# Patient Record
Sex: FEMALE | Race: WHITE | Employment: OTHER | URBAN - METROPOLITAN AREA
[De-identification: names, ages, dates, MRNs, and addresses within clinical notes are randomized per-mention and may not be internally consistent; named-entity substitution may affect disease eponyms.]

---

## 2018-05-05 PROBLEM — I71.21 ASCENDING AORTIC ANEURYSM: Status: ACTIVE | Noted: 2018-05-05

## 2018-05-05 PROBLEM — R91.1 PULMONARY NODULE: Status: ACTIVE | Noted: 2018-05-05

## 2020-02-18 ENCOUNTER — INITIAL CONSULT (OUTPATIENT)
Dept: PULMONOLOGY | Age: 70
End: 2020-02-18
Payer: COMMERCIAL

## 2020-02-18 VITALS
HEIGHT: 65 IN | SYSTOLIC BLOOD PRESSURE: 128 MMHG | DIASTOLIC BLOOD PRESSURE: 80 MMHG | BODY MASS INDEX: 48.82 KG/M2 | OXYGEN SATURATION: 97 % | HEART RATE: 81 BPM | WEIGHT: 293 LBS

## 2020-02-18 PROCEDURE — 99243 OFF/OP CNSLTJ NEW/EST LOW 30: CPT | Performed by: NURSE PRACTITIONER

## 2020-02-18 RX ORDER — CHLORAL HYDRATE 500 MG
1000 CAPSULE ORAL 2 TIMES DAILY
COMMUNITY

## 2020-02-18 RX ORDER — ATORVASTATIN CALCIUM 40 MG/1
40 TABLET, FILM COATED ORAL DAILY
COMMUNITY
Start: 2018-04-02

## 2020-02-18 RX ORDER — MAGNESIUM OXIDE 400 MG/1
400 TABLET ORAL DAILY
COMMUNITY

## 2020-02-18 ASSESSMENT — SLEEP AND FATIGUE QUESTIONNAIRES
HOW LIKELY ARE YOU TO NOD OFF OR FALL ASLEEP WHILE SITTING AND TALKING TO SOMEONE: 0
HOW LIKELY ARE YOU TO NOD OFF OR FALL ASLEEP IN A CAR, WHILE STOPPED FOR A FEW MINUTES IN TRAFFIC: 0
NECK CIRCUMFERENCE (INCHES): 16
HOW LIKELY ARE YOU TO NOD OFF OR FALL ASLEEP WHEN YOU ARE A PASSENGER IN A CAR FOR AN HOUR WITHOUT A BREAK: 2
HOW LIKELY ARE YOU TO NOD OFF OR FALL ASLEEP WHILE SITTING INACTIVE IN A PUBLIC PLACE: 0
ESS TOTAL SCORE: 5
HOW LIKELY ARE YOU TO NOD OFF OR FALL ASLEEP WHILE WATCHING TV: 0
HOW LIKELY ARE YOU TO NOD OFF OR FALL ASLEEP WHILE LYING DOWN TO REST IN THE AFTERNOON WHEN CIRCUMSTANCES PERMIT: 3
HOW LIKELY ARE YOU TO NOD OFF OR FALL ASLEEP WHILE SITTING AND READING: 0
HOW LIKELY ARE YOU TO NOD OFF OR FALL ASLEEP WHILE SITTING QUIETLY AFTER LUNCH WITHOUT ALCOHOL: 0

## 2020-02-18 NOTE — PROGRESS NOTES
Subjective:   CHIEF COMPLAINT / HPI:       Perico Newell is a 68 y.o. female with history of bronchitis, coronary artery disease, ovarian cancer, kidney stones, type 2 diabetes, atrial valve replacement, AAA, presents today for concerns of fatigue, hypersomnia, possible NII. Krishna Raygoza states that she has noticed for years that she has been fatigued and does not feel like she sleeps well. She states when she was in the hospital for her surgeries she was told that she would drop her oxygen level into the mid 80s while she was sleeping. There was no further work-up for possible nocturnal hypoxia while she was at the hospital..  With talking to her family primary care provider Dr. Hayley Prasad it was decided that she should have a sleep study and nocturnal oxygen study. Krishna Raygoza states that she usually goes to bed around 10 PM, sleeps generally 5-6 hours a night. States awakens during the night 1-2 time due to nocturia and restless and falls back to sleep within 15 minutes after getting up. States in the morning she is fatigued and does not feel like she has slept well. She is sleepy during the day but does not nap. Family reports mild snoring. Denies any symptoms of CHF. States dose not have restless leg syndrome, but will get up during night to walk around sometime due to restlessness. Sleep is not interrupted due to chronic pain. There has been witness apnea.     Influenza refuses  Pneumovax refuses  Smoker former smoker, quit 6/2004  PCP Hayley Prasad MD    Past Medical History:  Past Medical History:   Diagnosis Date    Bronchitis     CAD (coronary artery disease)     Cancer ([facility] [address]     Heart valve disorder     Kidney stones     Type 2 diabetes mellitus without complication (HCC)        Current Medications:      Current Outpatient Medications:     atorvastatin (LIPITOR) 40 MG tablet, Take 40 mg by mouth daily, Disp: , Rfl:     magnesium oxide (MAG-OX) 400 MG tablet, Take 400 mg by mouth daily, Disp: , Rfl:    current or ex partner: None     Emotionally abused: None     Physically abused: None     Forced sexual activity: None   Other Topics Concern    None   Social History Narrative    None       Family History:    Family History   Problem Relation Age of Onset    Kidney Disease Mother     Cancer Father          REVIEW OF SYSTEMS:    CONSTITUTIONAL:  negative for fevers, chills, diaphoresis, activity change, appetite change, night sweats and unexpected weight change. Positive fatigue, positive hypersomnia  HEENT:  negative for hearing loss,  sinus pressure, nasal congestion, epistaxis, positive snoring  RESPIRATORY: Negative for shortness of breath, negative for wheezing, negative for cough  CARDIOVASCULAR:  Negative for chest pain, palpitations, exertional chest pressure/discomfort, edema, syncope  GASTROINTESTINAL: negative for nausea, vomiting, diarrhea, constipation, blood in stool and abdominal pain  GENITOURINARY:  negative for frequency, dysuria and hematuria  HEMATOLOGIC/LYMPHATIC:  negative for easy bruising, bleeding and lymphadenopathy  ALLERGIC/IMMUNOLOGIC:  negative for recurrent infections, angioedema, anaphylaxis and drug reaction  MUSCULOSKELETAL:  negative for  pain, joint swelling, decreased range of motion and muscle weakness  NEURO: negative for headache, AMS, decrease sensations  SKIN: Negative for rashes or lesions      Objective:   VITALS:   Vitals:    02/18/20 1433   BP: 128/80   Pulse: 81   SpO2: 97%   Weight: 300 lb (136.1 kg)   Height: 5' 4.5\" (1.638 m)     Neck circumference: 16  Inches  Frannie - Total score: 5  MALLAMPATI:4  Body mass index is 50.7 kg/m².     PHYSICAL EXAM:    CONSTITUTIONAL:  awake, alert, cooperative, no apparent distress, and appears stated age  HEENT:  Supple and nontender,  trachea midline, no adenopathy, thyroid normal, no JVD, no wheezing or stridor over neck  CHEST: Chest expansion equal and symmetrical, no intercostal retraction, no increased work of breathing  LUNGS: Bilateral breath sounds clear and equal to auscultation with good air movement, no rales, no rhonchi, no wheezing, no cough, no chest pain with respiratory effort   CARDIOVASCULAR: Normal S1 and S2 , no murmurs or gallops ,no pericardial rubs  ABDOMEN:  normal bowel sounds, non-distended and no masses palpated, and no tenderness to palpation. LYMPHADENOPATHY:  no axillary or supraclavicular adenopathy. No cervical adenopathy  EXTREMITIES: No edema, no inflammation, no tenderness, no clubbing of digits  NEURO: Oriented X 3, No focal deficits  SKIN: warm and dry    LAB:CBC with Differential:    Lab Results   Component Value Date    WBC 8.1 06/06/2014    RBC 4.48 06/06/2014    HGB 14.4 06/06/2014    HCT 41.3 06/06/2014     06/06/2014    MCV 92.3 06/06/2014    MCH 32.3 06/06/2014    MCHC 35.0 06/06/2014    RDW 14.0 06/06/2014    SEGSPCT 69.9 06/06/2014    LYMPHOPCT 20.4 06/06/2014    MONOPCT 7.5 06/06/2014    BASOPCT 0.4 06/06/2014    MONOSABS 0.6 06/06/2014    LYMPHSABS 1.6 06/06/2014    EOSABS 0.1 06/06/2014    BASOSABS 0.0 06/06/2014    DIFFTYPE AUTOMATED DIFFERENTIAL 06/06/2014     BMP:    Lab Results   Component Value Date     06/06/2014    K 4.3 06/06/2014     06/06/2014    CO2 29 06/06/2014    BUN 18 06/06/2014    CREATININE 0.7 06/06/2014    CALCIUM 9.8 06/06/2014    GFRAA >60 06/06/2014    LABGLOM >60 06/06/2014     Hepatic Function Panel:    Lab Results   Component Value Date    ALKPHOS 84 06/06/2014    ALT 39 06/06/2014    AST 29 06/06/2014    PROT 7.9 06/06/2014    BILITOT 0.8 06/06/2014     ABG:  No results found for: RGK0SGA, BEART, F5PGJPXY, PHART, THGBART, NLS8TNP, PO2ART, RAM3FFQ    RADIOLOGY:        Assessment/Plan     1. Nocturnal hypoxia  We will proceed with an overnight apnea link to check for nocturnal hypoxia. If testing comes back with an AHI greater than 5 she is aware that she will need to proceed with an in lab sleep study.   In the meantime I have

## 2020-03-12 ENCOUNTER — TELEPHONE (OUTPATIENT)
Dept: PULMONOLOGY | Age: 70
End: 2020-03-12

## 2020-03-12 NOTE — TELEPHONE ENCOUNTER
Spoke to Leodan concerning her recent apnea link overnight oximetry test.  Testing demonstrated AHI of 13.8 and she will need sleep lab study. I will place the order for study today and informed her that the sleep lab will be contacting her to schedule.

## 2020-03-13 PROBLEM — I48.92 ATRIAL FLUTTER (HCC): Status: ACTIVE | Noted: 2020-01-13

## 2020-03-13 PROBLEM — I38 VALVULAR HEART DISEASE: Status: ACTIVE | Noted: 2020-03-13

## 2020-03-13 PROBLEM — E78.2 MIXED HYPERLIPIDEMIA: Status: ACTIVE | Noted: 2020-01-14

## 2020-03-13 PROBLEM — Z95.2 HISTORY OF HEART VALVE REPLACEMENT WITH MECHANICAL VALVE: Status: ACTIVE | Noted: 2020-03-13

## 2020-06-18 ENCOUNTER — HOSPITAL ENCOUNTER (OUTPATIENT)
Dept: SLEEP CENTER | Age: 70
Discharge: HOME | End: 2020-06-18
Payer: COMMERCIAL

## 2020-06-18 VITALS — HEIGHT: 65 IN | BODY MASS INDEX: 48.82 KG/M2 | WEIGHT: 293 LBS

## 2020-06-18 PROCEDURE — 95810 POLYSOM 6/> YRS 4/> PARAM: CPT

## 2020-06-18 ASSESSMENT — SLEEP AND FATIGUE QUESTIONNAIRES
HOW LIKELY ARE YOU TO NOD OFF OR FALL ASLEEP WHILE SITTING QUIETLY AFTER LUNCH WITHOUT ALCOHOL: 0
HOW LIKELY ARE YOU TO NOD OFF OR FALL ASLEEP WHILE SITTING AND READING: 0
HOW LIKELY ARE YOU TO NOD OFF OR FALL ASLEEP WHEN YOU ARE A PASSENGER IN A CAR FOR AN HOUR WITHOUT A BREAK: 0
HOW LIKELY ARE YOU TO NOD OFF OR FALL ASLEEP WHILE SITTING AND TALKING TO SOMEONE: 0
HOW LIKELY ARE YOU TO NOD OFF OR FALL ASLEEP WHILE SITTING INACTIVE IN A PUBLIC PLACE: 0
HOW LIKELY ARE YOU TO NOD OFF OR FALL ASLEEP WHILE LYING DOWN TO REST IN THE AFTERNOON WHEN CIRCUMSTANCES PERMIT: 3
HOW LIKELY ARE YOU TO NOD OFF OR FALL ASLEEP WHILE WATCHING TV: 2
HOW LIKELY ARE YOU TO NOD OFF OR FALL ASLEEP IN A CAR, WHILE STOPPED FOR A FEW MINUTES IN TRAFFIC: 0
ESS TOTAL SCORE: 5

## 2020-06-19 NOTE — PROGRESS NOTES
6/19/2020  sleep study  for [facility]  1950 is complete. Results are pending physician review.     Electronically signed by Leila Ayers on 6/19/2020 at 7:08 AM

## 2020-06-24 LAB — STATUS: NORMAL

## 2020-06-24 PROCEDURE — 95810 POLYSOM 6/> YRS 4/> PARAM: CPT | Performed by: INTERNAL MEDICINE

## 2020-06-25 ENCOUNTER — TELEPHONE (OUTPATIENT)
Dept: PULMONOLOGY | Age: 70
End: 2020-06-25

## 2020-08-17 NOTE — PROGRESS NOTES
*                          [address] [facility]   Follow Up Visit    Patient ID: Mk Rehman is a 68 y.o. female, presents to the pulmonary clinic for follow up for NII. Initial sleep study was completed on 6/18/2020 and demonstrated AHI 21.3 events/hour. AutoPAP was ordered with pressures 4 to 20 cm H2O with via pillow mask. Mk Rehman has been wearing CPAP for average of 10 hours, 17 minutes, and is compliant with use. Lansing Ezio states improved rest, less daytime fatigue and sleepiness. DME: [address] [facility] states they are practicing social distancing, wearing a mask when out in public, washing hands frequently or using hand . Denies any fever, chills, malaise, change in sensation of taste or smell, headache or lightheadedness. Denies any known contacts with persons with respiratory infection, positive for coronavirus or under investigation for possible coronavirus exposure. Smoking history:  Former smoker  PCP: Prince Alcocer MD    History:      Social History     Socioeconomic History    Marital status:      Spouse name: Not on file    Number of children: Not on file    Years of education: Not on file    Highest education level: Not on file   Occupational History    Not on file   Social Needs    Financial resource strain: Not on file    Food insecurity     Worry: Not on file     Inability: Not on file    Transportation needs     Medical: Not on file     Non-medical: Not on file   Tobacco Use    Smoking status: Former Smoker    Smokeless tobacco: Never Used   Substance and Sexual Activity    Alcohol use: No    Drug use: No    Sexual activity: Not on file   Lifestyle    Physical activity     Days per week: Not on file     Minutes per session: Not on file    Stress: Not on file   Relationships    Social connections     Talks on phone: Not on file     Gets together: Not on file     Attends Mandaeism service: Not on file     Active member of club or organization: Not on file     Attends meetings of clubs or organizations: Not on file     Relationship status: Not on file    Intimate partner violence     Fear of current or ex partner: Not on file     Emotionally abused: Not on file     Physically abused: Not on file     Forced sexual activity: Not on file   Other Topics Concern    Not on file   Social History Narrative    Not on file       Prior to Admission medications    Medication Sig Start Date End Date Taking? Authorizing Provider   albuterol sulfate  (90 Base) MCG/ACT inhaler Inhale 2 puffs into the lungs every 6 hours as needed 6/24/20  Yes Historical Provider, MD   atorvastatin (LIPITOR) 40 MG tablet Take 40 mg by mouth daily 4/2/18  Yes Historical Provider, MD   magnesium oxide (MAG-OX) 400 MG tablet Take 400 mg by mouth daily   Yes Historical Provider, MD   [place]-3 1000 MG CAPS Take 1,000 mg by mouth 2 times daily   Yes Historical Provider, MD   metFORMIN (GLUCOPHAGE) 1000 MG tablet Take 1 tablet by mouth 2 times daily 1/13/20  Yes Historical Provider, MD   calcium carbonate 600 MG TABS tablet Take 1 tablet by mouth daily   Yes Historical Provider, MD   Glucosamine-Chondroit-Vit C-Mn (GLUCOSAMINE-CHONDROITIN MAX ST PO) Take 2 tablets by mouth daily    Yes Historical Provider, MD   ibuprofen (ADVIL;MOTRIN) 200 MG tablet Take 200 mg by mouth every 6 hours as needed for Pain   Yes Historical Provider, MD   Multiple Vitamin (MULTI-VITAMIN DAILY PO) Take 1 tablet by mouth daily   Yes Historical Provider, MD   warfarin (COUMADIN) 5 MG tablet Take 5 mg by mouth Daily. Yes Historical Provider, MD   metoprolol (LOPRESSOR) 50 MG tablet Take 50 mg by mouth. 1/2 tab twice a day. Yes Historical Provider, MD   lisinopril-hydrochlorothiazide (PRINZIDE;ZESTORETIC) 10-12.5 MG per tablet Take 1 tablet by mouth.    Yes Historical Provider, MD [place]amm[place] Oil OIL Take  by mouth 2 times daily. Yes Historical Provider, MD       Allergies as of 08/18/2020    (No Known Allergies)       Patient Active Problem List   Diagnosis    [facility] ([facility] [address]    Pulmonary nodule    Atrial flutter (HCC)    History of heart valve replacement with mechanical valve    Mixed hyperlipidemia    Presence of prosthetic heart valve    Valvular heart disease    NII (obstructive sleep apnea)       Past Medical History:   Diagnosis Date    Bronchitis     CAD (coronary artery disease)     Cancer ([facility] [address]     Heart valve disorder     Kidney stones     Type 2 diabetes mellitus without complication (HCC)        Past Surgical History:   Procedure Laterality Date    HYSTERECTOMY, TOTAL ABDOMINAL      KNEE ARTHROSCOPY      TIBIA FRACTURE SURGERY         Family History   Problem Relation Age of Onset    Kidney Disease Mother     Cancer Father          REVIEW OF SYSTEMS:    CONSTITUTIONAL:  negative for fevers, chills, diaphoresis, activity change, appetite change, night sweats and unexpected weight change.    HEENT:  negative for hearing loss,  sinus pressure, nasal congestion, epistaxis and snoring  RESPIRATORY:  negative for SOB, cough, wheeze  CARDIOVASCULAR:  negative for chest pain, palpitations, exertional chest pressure/discomfort, edema, syncope  GASTROINTESTINAL: negative for nausea, vomiting, diarrhea, constipation, blood in stool and abdominal pain  GENITOURINARY:  negative for frequency, dysuria and hematuria  HEMATOLOGIC/LYMPHATIC:  negative for easy bruising, bleeding and lymphadenopathy  ALLERGIC/IMMUNOLOGIC:  negative for recurrent infections, angioedema, anaphylaxis and drug reaction  MUSCULOSKELETAL:  negative for  pain, joint swelling, decreased range of motion and muscle weakness  NEURO: negative for headache, AMS, decrease sensations  SKIN: Negative for rashes or lesions    Objective:   /82   Pulse 54   Ht 5' 4.5\" (1.638 m)   Wt 300 lb (136.1 kg)   SpO2 96%   BMI 50.70 kg/m²   Body mass index is 50.7 kg/m². Sleep Medicine 6/18/2020 2/18/2020   Sitting and reading 0 0   Watching TV 2 0   Sitting, inactive in a public place (e.g. a theatre or a meeting) 0 0   As a passenger in a car for an hour without a break 0 2   Lying down to rest in the afternoon when circumstances permit 3 3   Sitting and talking to someone 0 0   Sitting quietly after a lunch without alcohol 0 0   In a car, while stopped for a few minutes in traffic 0 0   Total score 5 5   Neck circumference 16 16       Gen: No distress. Eyes: PERRL. No sclera icterus. No conjunctival injection. ENT: No discharge. Pharynx clear. External appearance of ears and nose normal.  Neck: Trachea midline. No obvious mass. Resp: No accessory muscle use. No crackles. No wheezes. No rhonchi. No dullness on percussion. Bilateral breath sounds clear and equal with good air movement. CV: Regular rate. Regular rhythm. No murmur or rub. No edema. GI: Non-tender. Non-distended. No hernia. Skin: Warm, dry, normal texture and turgor. No nodule on exposed extremities. Lymph: No cervical LAD. No supraclavicular LAD. M/S: No cyanosis. No clubbing. No joint deformity. Psych: Oriented x 3. No anxiety. Awake. Alert. Intact judgement and insight. Assessment and Plan     #1. INI on CPAP - based on most recent compliancy report, Leodan is compliant with their NII treatment. Current AHI is 1.1. She states she has not changed her nasal pillows since she originally received them. We discussed the importance of changing her nasal pillows and that they should be changed every 2 to 4 weeks. If she is not receiving the equipment as ordered she needs to contact [id number] [email or web address] equipment to see what the problem with with getting her equipment.   Compliancy does demonstrate leak, her average pressure is 15 we have discussed the need for proper equipment maintanance and cleaning along with routine replacement of mask, head gear, cover, filter, humidification bottle and tubing. #2. Health maintenance -   We have discussed the need to maintain yearly flu immunization, pneumococcal vaccination. We have discussed Coronavirus precaution including: social distancing when needing to be in public, handwashing practice, wiping items touched in public such as gas pumps, door handles, shopping carts, etc. Self monitoring for infection - fever, chills, cough, SOB. Should they develop symptoms they should call office for further instructions. Follow-Up:    Return in about 3 months (around 11/18/2020).     Electronically signed by SOFÍA Harkins CNP on 8/19/2020 at 8:39 PM

## 2020-08-18 ENCOUNTER — OFFICE VISIT (OUTPATIENT)
Dept: PULMONOLOGY | Age: 70
End: 2020-08-18
Payer: COMMERCIAL

## 2020-08-18 VITALS
HEART RATE: 54 BPM | HEIGHT: 65 IN | WEIGHT: 293 LBS | DIASTOLIC BLOOD PRESSURE: 82 MMHG | BODY MASS INDEX: 48.82 KG/M2 | SYSTOLIC BLOOD PRESSURE: 122 MMHG | OXYGEN SATURATION: 96 %

## 2020-08-18 PROCEDURE — 99213 OFFICE O/P EST LOW 20 MIN: CPT | Performed by: NURSE PRACTITIONER

## 2020-08-18 RX ORDER — ALBUTEROL SULFATE 90 UG/1
2 AEROSOL, METERED RESPIRATORY (INHALATION) EVERY 6 HOURS PRN
COMMUNITY
Start: 2020-06-24

## 2021-06-01 ENCOUNTER — TELEPHONE (OUTPATIENT)
Dept: PULMONOLOGY | Age: 71
End: 2021-06-01

## 2021-06-01 NOTE — TELEPHONE ENCOUNTER
Patient called asking for appointment I advised her [facility] Alvarez is out for surgery will be back June 14. Patient stated she has been having sob for a while I asked patient if she has a fever she said she hasn't and I asked her if she has contacted her family doctor she stated no I told her to contact them to see if she is able to get in sooner and let them know [facility] Alvarez is out. I advised patient gonzalo Ag MA that if the sob became worse she needed to head to the er patient advised she is not going to the er. I made patient a follow up appointment June 29.

## 2022-05-16 DIAGNOSIS — Z12.11 COLON CANCER SCREENING: Primary | ICD-10-CM

## 2022-05-16 RX ORDER — POLYETHYLENE GLYCOL 3350, SODIUM SULFATE, SODIUM CHLORIDE, POTASSIUM CHLORIDE, ASCORBIC ACID, SODIUM ASCORBATE 140-9-5.2G
KIT ORAL
Qty: 1 EACH | Refills: 0 | Status: SHIPPED | OUTPATIENT
Start: 2022-05-16

## 2022-05-16 RX ORDER — SIMETHICONE 80 MG
TABLET,CHEWABLE ORAL
Qty: 3 TABLET | Refills: 0 | Status: SHIPPED | OUTPATIENT
Start: 2022-05-16

## 2022-07-18 NOTE — PROGRESS NOTES
Surgery @ [facility] on 7/25/22 you will be called 7/22/22 with times               1. Do not eat or drink anything after midnight - unless instructed by your doctor prior to surgery. This includes                   no water, chewing gum or mints. 2. Follow your directions as prescribed by the doctor for your procedure and medications. Take Metofrmin, Metoprolol morning of surgery with                   Sips of water. 3. Check with your Doctor regarding stopping vitamins, supplements, blood thinners (Plavix, Coumadin, Lovenox, Effient, Pradaxa, Xarelto, Fragmin or                   other blood thinners) and follow their instructions. 4. Do not smoke, vape or use chewing tobacco morning of surgery. Do not drink any alcoholic beverages 24 hours prior to surgery. This includes NA Beer. No street drugs 7 days prior to surgery. 5. You may brush your teeth and gargle the morning of surgery. DO NOT SWALLOW WATER   6. You MUST make arrangements for a responsible adult to take you home after your surgery and be able to check on you every couple                   hours for the day. You will not be allowed to leave alone or drive yourself home. It is strongly suggested someone stay with you the first 24                   hrs. Your surgery will be cancelled if you do not have a ride home. 7. Please wear simple, loose fitting clothing to the hospital.  Branden Bruce not bring valuables (money, credit cards, checkbooks, etc.) Do not wear any                   makeup (including no eye makeup) or nail polish on your fingers or toes. 8. DO NOT wear any jewelry or piercings on day of surgery. All body piercing jewelry must be removed. 9. If you have dentures, they will be removed before going to the OR; we will provide you a container. If you wear contact lenses or glasses,                  they will be removed; please bring a case for them.            10. If you  have a Living Will and Durable

## 2022-07-21 ENCOUNTER — ANESTHESIA EVENT (OUTPATIENT)
Dept: ENDOSCOPY | Age: 72
End: 2022-07-21
Payer: COMMERCIAL

## 2022-07-21 NOTE — ANESTHESIA PRE PROCEDURE
Department of Anesthesiology  Preprocedure Note       Name:  Julianne Vera   Age:  66 y.o.  :  1950                                          MRN:  [id number]         Date:  2022      Surgeon: Jessica Ambrosio):  Arptia Awad MD    Procedure: Procedure(s):  COLORECTAL CANCER SCREENING, NOT HIGH RISK    Medications prior to admission:   Prior to Admission medications    Medication Sig Start Date End Date Taking? Authorizing Provider   PLENVU 140 g SOLR AS DIRECTED FOR COLONOSCOPY PREP 22   Arpita Awad MD   simethicone (MYLICON) 80 MG chewable tablet AS DIRECTED FOR COLONOSCOPY PREP 22   Arpita Awad MD   albuterol sulfate  (90 Base) MCG/ACT inhaler Inhale 2 puffs into the lungs every 6 hours as needed 20   Historical Provider, MD   atorvastatin (LIPITOR) 40 MG tablet Take 40 mg by mouth daily 18   Historical Provider, MD   magnesium oxide (MAG-OX) 400 MG tablet Take 400 mg by mouth daily    Historical Provider, MD   [place]-3 1000 MG CAPS Take 1,000 mg by mouth 2 times daily    Historical Provider, MD   metFORMIN (GLUCOPHAGE) 1000 MG tablet Take 1 tablet by mouth 2 times daily 20   Historical Provider, MD   calcium carbonate 600 MG TABS tablet Take 1 tablet by mouth daily    Historical Provider, MD   Glucosamine-Chondroit-Vit C-Mn (GLUCOSAMINE-CHONDROITIN MAX ST PO) Take 2 tablets by mouth daily     Historical Provider, MD   ibuprofen (ADVIL;MOTRIN) 200 MG tablet Take 200 mg by mouth every 6 hours as needed for Pain    Historical Provider, MD   Multiple Vitamin (MULTI-VITAMIN DAILY PO) Take 1 tablet by mouth daily    Historical Provider, MD   warfarin (COUMADIN) 5 MG tablet Take 5 mg by mouth Daily. Historical Provider, MD   metoprolol (LOPRESSOR) 50 MG tablet Take 50 mg by mouth. 1/2 tab twice a day. Historical Provider, MD   lisinopril-hydrochlorothiazide (PRINZIDE;ZESTORETIC) 10-12.5 MG per tablet Take 1 tablet by mouth.     Historical Provider, MD   Fish Oil OIL Take  by mouth 2 times daily. Historical Provider, MD       Current medications:    No current facility-administered medications for this encounter. Current Outpatient Medications   Medication Sig Dispense Refill    PLENVU 140 g SOLR AS DIRECTED FOR COLONOSCOPY PREP 1 each 0    simethicone (MYLICON) 80 MG chewable tablet AS DIRECTED FOR COLONOSCOPY PREP 3 tablet 0    albuterol sulfate  (90 Base) MCG/ACT inhaler Inhale 2 puffs into the lungs every 6 hours as needed      atorvastatin (LIPITOR) 40 MG tablet Take 40 mg by mouth daily      magnesium oxide (MAG-OX) 400 MG tablet Take 400 mg by mouth daily      Omega-3 1000 MG CAPS Take 1,000 mg by mouth 2 times daily      metFORMIN (GLUCOPHAGE) 1000 MG tablet Take 1 tablet by mouth 2 times daily      calcium carbonate 600 MG TABS tablet Take 1 tablet by mouth daily      Glucosamine-Chondroit-Vit C-Mn (GLUCOSAMINE-CHONDROITIN MAX ST PO) Take 2 tablets by mouth daily       ibuprofen (ADVIL;MOTRIN) 200 MG tablet Take 200 mg by mouth every 6 hours as needed for Pain      Multiple Vitamin (MULTI-VITAMIN DAILY PO) Take 1 tablet by mouth daily      warfarin (COUMADIN) 5 MG tablet Take 5 mg by mouth Daily.  metoprolol (LOPRESSOR) 50 MG tablet Take 50 mg by mouth. 1/2 tab twice a day.  lisinopril-hydrochlorothiazide (PRINZIDE;ZESTORETIC) 10-12.5 MG per tablet Take 1 tablet by mouth.  Fish Oil OIL Take  by mouth 2 times daily.          Allergies:  No Known Allergies    Problem List:    Patient Active Problem List   Diagnosis Code    Ascending aortic aneurysm ([facility] [address] I71.2    Pulmonary nodule R91.1    Atrial flutter (HCC) I48.92    History of heart valve replacement with mechanical valve Z95.2    Mixed hyperlipidemia E78.2    Presence of prosthetic heart valve Z95.2    Valvular heart disease I38    NII (obstructive sleep apnea) G47.33       Past Medical History:        Diagnosis Date    Bronchitis     CAD (coronary artery disease) results for input(s): POCGLU, POCNA, POCK, POCCL, POCBUN, POCHEMO, POCHCT in the last 72 hours. Coags:   Lab Results   Component Value Date/Time    PROTIME 32.6 07/30/2015 02:39 PM    INR 2.9 07/30/2015 02:39 PM    INR 2.66 06/06/2014 12:11 PM    APTT 33.7 06/06/2014 12:11 PM       HCG (If Applicable): No results found for: PREGTESTUR, PREGSERUM, HCG, HCGQUANT     ABGs: No results found for: PHART, PO2ART, LQX9USS, EXN6RIB, BEART, B1RDLNHS     Type & Screen (If Applicable):  No results found for: LABABO, LABRH    Drug/Infectious Status (If Applicable):  No results found for: HIV, HEPCAB    COVID-19 Screening (If Applicable): No results found for: COVID19        Anesthesia Evaluation  Patient summary reviewed no history of anesthetic complications:   Airway: Mallampati: II  TM distance: >3 FB   Neck ROM: full  Mouth opening: > = 3 FB   Dental: normal exam         Pulmonary: breath sounds clear to auscultation  (+) sleep apnea:                            ROS comment: Former - 25 pack years   Cardiovascular:  Exercise tolerance: good (>4 METS),   (+) CAD:, dysrhythmias: atrial flutter,         Rhythm: regular  Rate: normal           Beta Blocker:  Not on Beta Blocker         Neuro/Psych:   Negative Neuro/Psych ROS              GI/Hepatic/Renal:   (+) bowel prep, morbid obesity          Endo/Other:    (+) DiabetesType II DM, , malignancy/cancer. ROS comment: Uterine cancer - s/p HYSTERECTOMY, TOTAL ABDOMINAL  Abdominal:             Vascular: negative vascular ROS. Other Findings:           Anesthesia Plan      MAC     ASA 3       Induction: intravenous. Anesthetic plan and risks discussed with patient. Plan discussed with CRNA. Pre Anesthesia Evaluation complete. Anesthesia plan, risks, benefits, alternatives, and personal involved discussed with patient. Patients and/or legal guardian verbalized an understanding  and agreed to proceed.   Ez Heading, DO  7/25/2022

## 2022-07-22 NOTE — PROGRESS NOTES
Spoke with patient and she will arrive at [address] [place] [address] at [facility] on 7/25/2022 for her procedure at 0915.

## 2022-07-24 NOTE — H&P
Original H &P in soft chart. I have examined the patient immediately before the procedure and there is no change in the previous history and physical exam, which has been reviewed. There is a history of sleep apnea. There has been no  previous adverse experience with sedation/anesthesia. There is no increased risk for aspiration of gastric contents. The patient has been instructed that all resuscitative measures (during the operative and immediate perioperative period) will be instituted in the unlikely event that they will be needed. The patient has no pertinent past surgical or family history other than listed in the original H&P. The patient was counseled about the risks of johnny Covid-19 during their perioperative period and any recovery window from their procedure. The patient was made aware that johnny Covid-19  may worsen their prognosis for recovering from their procedure  and lend to a higher morbidity and/or mortality risk. All material risks, benefits, and reasonable alternatives including postponing the procedure were discussed. The patient does wish to proceed with the procedure at this time.     ASA Class: 3  AIRWAY Class: 1

## 2022-07-25 ENCOUNTER — ANESTHESIA (OUTPATIENT)
Dept: ENDOSCOPY | Age: 72
End: 2022-07-25
Payer: COMMERCIAL

## 2022-07-25 ENCOUNTER — HOSPITAL ENCOUNTER (OUTPATIENT)
Age: 72
Setting detail: OUTPATIENT SURGERY
Discharge: HOME | End: 2022-07-25
Attending: SPECIALIST | Admitting: SPECIALIST
Payer: COMMERCIAL

## 2022-07-25 VITALS
HEIGHT: 64 IN | TEMPERATURE: 97.1 F | DIASTOLIC BLOOD PRESSURE: 63 MMHG | SYSTOLIC BLOOD PRESSURE: 125 MMHG | RESPIRATION RATE: 16 BRPM | WEIGHT: 265 LBS | BODY MASS INDEX: 45.24 KG/M2 | OXYGEN SATURATION: 96 % | HEART RATE: 52 BPM

## 2022-07-25 PROBLEM — Z12.11 COLON CANCER SCREENING: Status: ACTIVE | Noted: 2022-07-25

## 2022-07-25 LAB
APTT: 28.8 SECONDS (ref 25.1–37.1)
GLUCOSE BLD-MCNC: 172 MG/DL (ref 70–99)
GLUCOSE BLD-MCNC: 182 MG/DL (ref 70–99)
INR BLD: 0.97 INDEX
PROTHROMBIN TIME: 12.5 SECONDS (ref 11.7–14.5)

## 2022-07-25 PROCEDURE — 2709999900 HC NON-CHARGEABLE SUPPLY: Performed by: SPECIALIST

## 2022-07-25 PROCEDURE — 3609027000 HC COLONOSCOPY: Performed by: SPECIALIST

## 2022-07-25 PROCEDURE — 45378 DIAGNOSTIC COLONOSCOPY: CPT | Performed by: SPECIALIST

## 2022-07-25 PROCEDURE — 7100000010 HC PHASE II RECOVERY - FIRST 15 MIN: Performed by: SPECIALIST

## 2022-07-25 PROCEDURE — 85610 PROTHROMBIN TIME: CPT

## 2022-07-25 PROCEDURE — 2580000003 HC RX 258: Performed by: ANESTHESIOLOGY

## 2022-07-25 PROCEDURE — 3700000001 HC ADD 15 MINUTES (ANESTHESIA): Performed by: SPECIALIST

## 2022-07-25 PROCEDURE — 82962 GLUCOSE BLOOD TEST: CPT

## 2022-07-25 PROCEDURE — 3700000000 HC ANESTHESIA ATTENDED CARE: Performed by: SPECIALIST

## 2022-07-25 PROCEDURE — 85730 THROMBOPLASTIN TIME PARTIAL: CPT

## 2022-07-25 PROCEDURE — 7100000011 HC PHASE II RECOVERY - ADDTL 15 MIN: Performed by: SPECIALIST

## 2022-07-25 PROCEDURE — 6360000002 HC RX W HCPCS

## 2022-07-25 RX ORDER — SODIUM CHLORIDE, SODIUM LACTATE, POTASSIUM CHLORIDE, CALCIUM CHLORIDE 600; 310; 30; 20 MG/100ML; MG/100ML; MG/100ML; MG/100ML
INJECTION, SOLUTION INTRAVENOUS CONTINUOUS
Status: DISCONTINUED | OUTPATIENT
Start: 2022-07-25 | End: 2022-07-25 | Stop reason: HOSPADM

## 2022-07-25 RX ORDER — ASPIRIN 81 MG/1
81 TABLET, CHEWABLE ORAL DAILY
COMMUNITY

## 2022-07-25 RX ORDER — ENOXAPARIN SODIUM 100 MG/ML
100 INJECTION SUBCUTANEOUS 2 TIMES DAILY
Status: DISCONTINUED | OUTPATIENT
Start: 2022-07-25 | End: 2022-07-25 | Stop reason: HOSPADM

## 2022-07-25 RX ORDER — ENOXAPARIN SODIUM 100 MG/ML
1 INJECTION SUBCUTANEOUS 2 TIMES DAILY
Qty: 4 ML | Refills: 0 | Status: SHIPPED | OUTPATIENT
Start: 2022-07-25 | End: 2022-07-27

## 2022-07-25 RX ORDER — SPIRONOLACTONE 25 MG/1
25 TABLET ORAL DAILY
COMMUNITY

## 2022-07-25 RX ORDER — PROPOFOL 10 MG/ML
INJECTION, EMULSION INTRAVENOUS PRN
Status: DISCONTINUED | OUTPATIENT
Start: 2022-07-25 | End: 2022-07-25 | Stop reason: SDUPTHER

## 2022-07-25 RX ORDER — FUROSEMIDE 40 MG/1
40 TABLET ORAL DAILY
COMMUNITY

## 2022-07-25 RX ADMIN — PROPOFOL 370 MG: 10 INJECTION, EMULSION INTRAVENOUS at 09:18

## 2022-07-25 RX ADMIN — SODIUM CHLORIDE, POTASSIUM CHLORIDE, SODIUM LACTATE AND CALCIUM CHLORIDE: 600; 310; 30; 20 INJECTION, SOLUTION INTRAVENOUS at 09:15

## 2022-07-25 ASSESSMENT — PAIN - FUNCTIONAL ASSESSMENT: PAIN_FUNCTIONAL_ASSESSMENT: 0-10

## 2022-07-25 NOTE — PROGRESS NOTES
Discharge instructions reviewed with pt and . All questions answered at this time. Pt ambulating appropriately in room, discharged to private vehicle with  per [facility].

## 2022-07-25 NOTE — DISCHARGE INSTRUCTIONS
COLONOSCOPY    DR. Makayla Hidalgo    OFFICE NUMBER [phone]    FOLLOW UP APPOINTMENT AS NEEDED. REPEAT PROCEDURE AS NEEDED. TEST ORDERED: NONE     What to expect at home: Your Recovery   Your doctor will tell you when you can eat and do your other usual activities Your doctor will talk to you about when you will need your next colonoscopy. Your doctor can help you decide how often you need to be checked. This will depend on the results of your test and your risk for colorectal cancer. After the test, you may be bloated or have gas pains. You may need to pass gas. If a biopsy was done or a polyp was removed, you may have streaks of blood in your stool (feces) for a few days. This care sheet gives you a general idea about how long it will take for you to recover. But each person recovers at a different pace. Follow the steps below to get better as quickly as possible. How can you care for yourself at home? Activity  Rest when you feel tired. Diet  Follow your doctor's directions for eating. Unless your doctor has told you not to, drink plenty of fluids. This helps to replace the fluids that were lost during the colon prep. DO NOT DRINK ALCOHOL. Medicines  Your doctor will tell you if and when you can restart your medicines. He or she will also give you instructions about taking any new medicines. If you take blood thinners, such as warfarin (Coumadin), clopidogrel (Plavix), or aspirin, be sure to talk to your doctor. He or she will tell you if and when to start taking those medicines again. Make sure that you understand exactly what your doctor wants you to do. If polyps were removed or a biopsy was done during the test, your doctor may tell you not to take aspirin or other anti-inflammatory medicines for a few days. These include ibuprofen (Advil, Motrin) and naproxen (Aleve). Other instructions: Anethesia  For your safety, do not drive or operate machinery for 24 hours.   Do not sign legal documents or make major decisions for 24 hours. The anesthesia can make it hard for you to fully understand what you are agreeing to. Follow-up care is a key part of your treatment and safety. Be sure to make and go to all appointments, and call your doctor if you are having problems. It's also a good idea to know your test results and keep a list of the medicines you take. When should you call for help? [address] [facility] [place] [phone]  Call 911 anytime you think you may need emergency care. For example, call if:  You passed out (lost consciousness). You pass maroon or bloody stools. You have severe belly pain. Call your doctor now or seek immediate medical care if:  Your stools are black and tarlike. Your stools have streaks of blood, but you did not have a biopsy or any polyps removed. You have belly pain, or your belly is swollen and firm. You vomit. You have a fever. You are very dizzy. Watch closely for changes in your health, and be sure to contact your doctor if you have any problems. Where can you learn more? Go to [email or web address] org and sign in to your [employer] account. Enter E264 in the [employer] box to learn more about Colonoscopy: What to Expect at Home.     If you do not have an account, please click on the Sign Up Now link. © 1721-7282 Healthwise, Incorporated. Care instructions adapted under license by [facility] ([facility]). This care instruction is for use with your licensed healthcare professional. If you have questions about a medical condition or this instruction, always ask your healthcare professional. [place] [address] any warranty or liability for your use of this information.   Content Version: 23.8.481172; Current as of: November 20, 2015

## 2022-07-25 NOTE — PROGRESS NOTES
Patient is back to baseline. AOR x 3. Report given to [facility], RN.  at bedside.

## 2022-07-25 NOTE — ANESTHESIA POSTPROCEDURE EVALUATION
Department of Anesthesiology  Postprocedure Note    Patient: Arnie Flores  MRN: [id number]  YOB: 1950  Date of evaluation: 7/25/2022      Procedure Summary     Date: 07/25/22 Room / Location: [address]    Anesthesia Start: [address] Anesthesia Stop: 1011    Procedure: COLONOSCOPY DIAGNOSTIC Diagnosis:       Screen for colon cancer      (Screen for colon cancer [Z12.11])    Surgeons: Marisol Son MD Responsible Provider: Keyla Salazar DO    Anesthesia Type: MAC ASA Status: 3          Anesthesia Type: No value filed.     Harjeet Phase I:      Harjeet Phase II:        Anesthesia Post Evaluation    Patient location during evaluation: bedside  Patient participation: complete - patient participated  Level of consciousness: awake  Airway patency: patent  Nausea & Vomiting: no nausea and no vomiting  Complications: no  Cardiovascular status: blood pressure returned to baseline  Respiratory status: acceptable and room air  Hydration status: euvolemic

## 2022-07-25 NOTE — BRIEF OP NOTE
BRIEF COLONOSCOPY REPORT:   Photos and full colonoscopy report available by going to \"chart review\" then \"procedures\" then  \"colonoscopy\" then \"View Report\"      IMPRESSION :   1) pan-diverticulosis noted but concentrated in the sigmoid  2) small internal hemorrhoids  3) otherwise normal colon    PLAN : per current established guidelines, no future screening colonoscopy suggested

## 2022-07-25 NOTE — PROGRESS NOTES
Pt arrived to hospitals from Endoscopy. Handoff received from Anjelica Perez 694. Dr. Guero Perez to bedside to update pt and , all questions answered at this time. Beverage of choice offered to pt and tolerated well.

## 2022-07-25 NOTE — PROGRESS NOTES
Received report from Mickey Shine, [address] [place] [address] in [facility]. Patient alert and oriented. Verified patient's name, , allergies and procedure. Took metoprolol 22 @ 0615, coumadin on 22, lovenox on 22 @ 2030, has a mechanical heart valve.

## 2022-08-16 ENCOUNTER — HOSPITAL ENCOUNTER (EMERGENCY)
Age: 72
Discharge: HOME | End: 2022-08-16
Attending: EMERGENCY MEDICINE
Payer: COMMERCIAL

## 2022-08-16 ENCOUNTER — APPOINTMENT (OUTPATIENT)
Dept: CT IMAGING | Age: 72
End: 2022-08-16
Payer: COMMERCIAL

## 2022-08-16 VITALS
BODY MASS INDEX: 45.49 KG/M2 | OXYGEN SATURATION: 95 % | SYSTOLIC BLOOD PRESSURE: 136 MMHG | DIASTOLIC BLOOD PRESSURE: 88 MMHG | TEMPERATURE: 98.2 F | HEART RATE: 78 BPM | RESPIRATION RATE: 18 BRPM | WEIGHT: 265 LBS

## 2022-08-16 DIAGNOSIS — M54.50 ACUTE BILATERAL LOW BACK PAIN WITHOUT SCIATICA: Primary | ICD-10-CM

## 2022-08-16 LAB
ALBUMIN SERPL-MCNC: 4.7 GM/DL (ref 3.4–5)
ALP BLD-CCNC: 121 IU/L (ref 40–129)
ALT SERPL-CCNC: 17 U/L (ref 10–40)
ANION GAP SERPL CALCULATED.3IONS-SCNC: 12 MMOL/L (ref 4–16)
AST SERPL-CCNC: 22 IU/L (ref 15–37)
BACTERIA: NEGATIVE /HPF
BASOPHILS ABSOLUTE: 0.1 K/CU MM
BASOPHILS RELATIVE PERCENT: 0.5 % (ref 0–1)
BILIRUB SERPL-MCNC: 0.7 MG/DL (ref 0–1)
BILIRUBIN URINE: NEGATIVE MG/DL
BLOOD, URINE: NEGATIVE
BUN BLDV-MCNC: 19 MG/DL (ref 6–23)
CALCIUM SERPL-MCNC: 9.7 MG/DL (ref 8.3–10.6)
CHLORIDE BLD-SCNC: 100 MMOL/L (ref 99–110)
CLARITY: CLEAR
CO2: 26 MMOL/L (ref 21–32)
COLOR: YELLOW
CREAT SERPL-MCNC: 0.7 MG/DL (ref 0.6–1.1)
DIFFERENTIAL TYPE: ABNORMAL
EOSINOPHILS ABSOLUTE: 0.1 K/CU MM
EOSINOPHILS RELATIVE PERCENT: 1.1 % (ref 0–3)
GFR AFRICAN AMERICAN: >60 ML/MIN/1.73M2
GFR NON-AFRICAN AMERICAN: >60 ML/MIN/1.73M2
GLUCOSE BLD-MCNC: 168 MG/DL (ref 70–99)
GLUCOSE, URINE: >1000 MG/DL
HCT VFR BLD CALC: 45.9 % (ref 37–47)
HEMOGLOBIN: 15.4 GM/DL (ref 12.5–16)
IMMATURE NEUTROPHIL %: 0.4 % (ref 0–0.43)
KETONES, URINE: NEGATIVE MG/DL
LEUKOCYTE ESTERASE, URINE: NEGATIVE
LYMPHOCYTES ABSOLUTE: 1.3 K/CU MM
LYMPHOCYTES RELATIVE PERCENT: 14 % (ref 24–44)
MCH RBC QN AUTO: 32.4 PG (ref 27–31)
MCHC RBC AUTO-ENTMCNC: 33.6 % (ref 32–36)
MCV RBC AUTO: 96.4 FL (ref 78–100)
MONOCYTES ABSOLUTE: 0.6 K/CU MM
MONOCYTES RELATIVE PERCENT: 6.9 % (ref 0–4)
NITRITE URINE, QUANTITATIVE: NEGATIVE
NUCLEATED RBC %: 0 %
PDW BLD-RTO: 13.2 % (ref 11.7–14.9)
PH, URINE: 5.5 (ref 5–8)
PLATELET # BLD: 341 K/CU MM (ref 140–440)
PMV BLD AUTO: 10 FL (ref 7.5–11.1)
POTASSIUM SERPL-SCNC: 4.5 MMOL/L (ref 3.5–5.1)
PROTEIN UA: NEGATIVE MG/DL
RBC # BLD: 4.76 M/CU MM (ref 4.2–5.4)
RBC URINE: ABNORMAL /HPF (ref 0–6)
SEGMENTED NEUTROPHILS ABSOLUTE COUNT: 7.2 K/CU MM
SEGMENTED NEUTROPHILS RELATIVE PERCENT: 77.1 % (ref 36–66)
SODIUM BLD-SCNC: 138 MMOL/L (ref 135–145)
SPECIFIC GRAVITY UA: 1.01 (ref 1–1.03)
SQUAMOUS EPITHELIAL: 1 /HPF
TOTAL IMMATURE NEUTOROPHIL: 0.04 K/CU MM
TOTAL NUCLEATED RBC: 0 K/CU MM
TOTAL PROTEIN: 8.2 GM/DL (ref 6.4–8.2)
TRICHOMONAS: ABNORMAL /HPF
UROBILINOGEN, URINE: 0.2 MG/DL (ref 0.2–1)
WBC # BLD: 9.3 K/CU MM (ref 4–10.5)
WBC UA: 1 /HPF (ref 0–5)

## 2022-08-16 PROCEDURE — 93005 ELECTROCARDIOGRAM TRACING: CPT | Performed by: EMERGENCY MEDICINE

## 2022-08-16 PROCEDURE — 87086 URINE CULTURE/COLONY COUNT: CPT

## 2022-08-16 PROCEDURE — 6360000002 HC RX W HCPCS: Performed by: EMERGENCY MEDICINE

## 2022-08-16 PROCEDURE — 85025 COMPLETE CBC W/AUTO DIFF WBC: CPT

## 2022-08-16 PROCEDURE — 74177 CT ABD & PELVIS W/CONTRAST: CPT

## 2022-08-16 PROCEDURE — 96375 TX/PRO/DX INJ NEW DRUG ADDON: CPT

## 2022-08-16 PROCEDURE — 80053 COMPREHEN METABOLIC PANEL: CPT

## 2022-08-16 PROCEDURE — 96374 THER/PROPH/DIAG INJ IV PUSH: CPT

## 2022-08-16 PROCEDURE — 6360000004 HC RX CONTRAST MEDICATION: Performed by: EMERGENCY MEDICINE

## 2022-08-16 PROCEDURE — 99285 EMERGENCY DEPT VISIT HI MDM: CPT

## 2022-08-16 PROCEDURE — 81001 URINALYSIS AUTO W/SCOPE: CPT

## 2022-08-16 RX ORDER — IBUPROFEN 600 MG/1
600 TABLET ORAL EVERY 8 HOURS PRN
Qty: 20 TABLET | Refills: 0 | Status: SHIPPED | OUTPATIENT
Start: 2022-08-16

## 2022-08-16 RX ORDER — KETOROLAC TROMETHAMINE 30 MG/ML
15 INJECTION, SOLUTION INTRAMUSCULAR; INTRAVENOUS ONCE
Status: COMPLETED | OUTPATIENT
Start: 2022-08-16 | End: 2022-08-16

## 2022-08-16 RX ORDER — DIAZEPAM 2 MG/1
TABLET ORAL
Qty: 8 TABLET | Refills: 0 | Status: SHIPPED | OUTPATIENT
Start: 2022-08-16 | End: 2022-08-26

## 2022-08-16 RX ORDER — FENTANYL CITRATE 50 UG/ML
25 INJECTION, SOLUTION INTRAMUSCULAR; INTRAVENOUS ONCE
Status: COMPLETED | OUTPATIENT
Start: 2022-08-16 | End: 2022-08-16

## 2022-08-16 RX ORDER — LIDOCAINE 50 MG/G
2 PATCH TOPICAL DAILY
Qty: 15 PATCH | Refills: 0 | Status: SHIPPED | OUTPATIENT
Start: 2022-08-16 | End: 2022-08-24

## 2022-08-16 RX ADMIN — KETOROLAC TROMETHAMINE 15 MG: 30 INJECTION, SOLUTION INTRAMUSCULAR at 19:36

## 2022-08-16 RX ADMIN — FENTANYL CITRATE 25 MCG: 50 INJECTION, SOLUTION INTRAMUSCULAR; INTRAVENOUS at 17:11

## 2022-08-16 RX ADMIN — IOPAMIDOL 80 ML: 755 INJECTION, SOLUTION INTRAVENOUS at 18:15

## 2022-08-16 ASSESSMENT — PAIN SCALES - GENERAL
PAINLEVEL_OUTOF10: 8
PAINLEVEL_OUTOF10: 9
PAINLEVEL_OUTOF10: 8

## 2022-08-16 ASSESSMENT — PAIN DESCRIPTION - ORIENTATION: ORIENTATION: RIGHT;LEFT

## 2022-08-16 ASSESSMENT — PAIN DESCRIPTION - DESCRIPTORS: DESCRIPTORS: SPASM

## 2022-08-16 ASSESSMENT — PAIN DESCRIPTION - LOCATION: LOCATION: FLANK

## 2022-08-16 NOTE — ED PROVIDER NOTES
Emergency Department Encounter  Location: [address] [place] [address] EMERGENCY DEPARTMENT    Patient: Chirag Bunch  MRN: [id number]  : 1950  Date of evaluation: 2022  ED Provider: Yemi Simmons DO    Chief Complaint:    Flank Pain (Bilateral x 2 weeks, denies urinary s/s)    [place]:  Chirag Bunch is a 66 y.o. female that presents to the emergency department with concern for bilateral flank pain. States she woke up with the pain about 10 days ago. Denies any preceding trauma or unusual activity. States the pain is worse if she lays on her left side as well as when she tries to sit up from lying down. Denies any radiation of pain into her legs, hips or abdomen. Not associated with fever or chills. Does report a mildly decreased appetite without vomiting or change in bowel habits. Denies urinary symptoms. No cough or associated shortness of breath. She is on Coumadin due to prosthetic heart valve. Indicates her INR goal is 2.5-3.5.    ROS:  At least 10 systems reviewed and are acutely negative unless otherwise noted in the HPI.     Past Medical History:   Diagnosis Date    Bronchitis     CAD (coronary artery disease)     Follows with Dr. Gypsy Lau ([address])    Cancer [facility]) 2014    Uterine cancer    Heart valve disorder     Kidney stones     Type 2 diabetes mellitus without complication [facility])      Past Surgical History:   Procedure Laterality Date    COLONOSCOPY N/A 2022    COLONOSCOPY DIAGNOSTIC performed by Eddie Andres MD at [facility], TOTAL ABDOMINAL (CERVIX REMOVED)      KNEE ARTHROSCOPY      TIBIA FRACTURE SURGERY       Family History   Problem Relation Age of Onset    Kidney Disease Mother     Cancer Father      Social History     Socioeconomic History    Marital status:      Spouse name: Not on file    Number of children: Not on file    Years of education: Not on file    Highest education level: Not on file   Occupational History Not on file   Tobacco Use    Smoking status: Former     Packs/day: 1.00     Years: 25.00     Pack years: 25.00     Types: Cigarettes    Smokeless tobacco: Never   Vaping Use    Vaping Use: Never used   Substance and Sexual Activity    Alcohol use: No    Drug use: No    Sexual activity: Not on file   Other Topics Concern    Not on file   Social History Narrative    Not on file     Social Determinants of Health     Financial Resource Strain: Not on file   Food Insecurity: Not on file   Transportation Needs: Not on file   Physical Activity: Not on file   Stress: Not on file   Social Connections: Not on file   Intimate Partner Violence: Not on file   Housing Stability: Not on file     No current facility-administered medications for this encounter. Current Outpatient Medications   Medication Sig Dispense Refill    ibuprofen (ADVIL;MOTRIN) 600 MG tablet Take 1 tablet by mouth every 8 hours as needed for Pain 20 tablet 0    lidocaine (LIDODERM) 5 % Place 2 patches onto the skin in the morning for 8 days. 12 hours on, 12 hours off. . 15 patch 0    diazePAM (VALIUM) 2 MG tablet 1-2 tabs by mouth PO Q8 hours PRN muscle spasm. 8 tablet 0    dapagliflozin (FARXIGA) 10 MG tablet Take 10 mg by mouth every morning      furosemide (LASIX) 40 MG tablet Take 40 mg by mouth in the morning. spironolactone (ALDACTONE) 25 MG tablet Take 25 mg by mouth in the morning. aspirin 81 MG chewable tablet Take 81 mg by mouth in the morning.       PLENVU 140 g SOLR AS DIRECTED FOR COLONOSCOPY PREP 1 each 0    simethicone (MYLICON) 80 MG chewable tablet AS DIRECTED FOR COLONOSCOPY PREP 3 tablet 0    albuterol sulfate  (90 Base) MCG/ACT inhaler Inhale 2 puffs into the lungs every 6 hours as needed      atorvastatin (LIPITOR) 40 MG tablet Take 40 mg by mouth daily      magnesium oxide (MAG-OX) 400 MG tablet Take 400 mg by mouth daily      Omega-3 1000 MG CAPS Take 1,000 mg by mouth 2 times daily      metFORMIN (GLUCOPHAGE) 1000 MG tablet Take 1 tablet by mouth 2 times daily      calcium carbonate 600 MG TABS tablet Take 1 tablet by mouth daily      Glucosamine-Chondroit-Vit C-Mn (GLUCOSAMINE-CHONDROITIN MAX ST PO) Take 2 tablets by mouth daily       Multiple Vitamin (MULTI-VITAMIN DAILY PO) Take 1 tablet by mouth daily      warfarin (COUMADIN) 5 MG tablet Take 5 mg by mouth Daily. metoprolol (LOPRESSOR) 50 MG tablet Take 50 mg by mouth. 1/2 tab twice a day. lisinopril-hydrochlorothiazide (PRINZIDE;ZESTORETIC) 10-12.5 MG per tablet Take 1 tablet by mouth. (Patient not taking: Reported on 7/25/2022)      Fish Oil OIL Take  by mouth 2 times daily. No Known Allergies    Nursing Notes Reviewed    Physical Exam:  ED Triage Vitals   Enc Vitals Group      BP 08/16/22 1409 136/88      Heart Rate 08/16/22 1409 78      Resp 08/16/22 1409 18      Temp 08/16/22 1409 98.2 °F (36.8 °C)      Temp src --       SpO2 08/16/22 1409 95 %      Weight 08/16/22 1457 265 lb (120.2 kg)      Height --       Head Circumference --       Peak Flow --       Pain Score --       Pain Loc --       Pain Edu? --       Excl. in [address]? --      GENERAL APPEARANCE: Awake and alert. Cooperative. No acute distress. Body mass index is 45.49 kg/m². HEAD: Normocephalic. Atraumatic. EYES: EOM's grossly intact. Sclera anicteric. ENT: Tolerates saliva. No trismus. NECK: Supple. Trachea midline. CARDIO: RRR. Radial pulse 2+. LUNGS: Respirations unlabored. CTAB. ABDOMEN: Soft. Non-distended. Non-tender. Back: No midline T or L-spine tenderness. Bilateral paraspinal muscular tenderness in the lumbar region. Areas normal to inspection. EXTREMITIES: No acute deformities. No lower extremity tenderness, edema or asymmetry. Symmetric lower extremity pulses. SKIN: Warm and dry. NEUROLOGICAL: No gross facial drooping. Moves all 4 extremities spontaneously. Symmetric strength and intact sensation x4 extremities.   Symmetric patellar reflexes in bilateral Protein, UA NEGATIVE NEGATIVE MG/DL    Urobilinogen, Urine 0.2 0.2 - 1.0 MG/DL    Nitrite Urine, Quantitative NEGATIVE NEGATIVE    Leukocyte Esterase, Urine NEGATIVE NEGATIVE    RBC, UA NONE SEEN 0 - 6 /HPF    WBC, UA 1 0 - 5 /HPF    Bacteria, UA NEGATIVE NEGATIVE /HPF    Squam Epithel, UA 1 /HPF    Trichomonas, UA NONE SEEN NONE SEEN /HPF   EKG 12 Lead   Result Value Ref Range    Ventricular Rate 73 BPM    Atrial Rate 73 BPM    QRS Duration 86 ms    Q-T Interval 392 ms    QTc Calculation (Bazett) 431 ms    R Axis 14 degrees    T Axis 189 degrees    Diagnosis       Undetermined rhythm  T wave abnormality, consider inferolateral ischemia  Abnormal ECG  No previous ECGs available         EKG (if obtained): (All EKG's are interpreted by myself in the absence of a cardiologist)  Sinus rhythm at 73. Normal axis with good R wave progression. Nonspecific T wave inversions without ST elevation. No prior EKG available for comparison    Radiographs (if obtained):  [] The following radiograph was interpreted by myself in the absence of a radiologist:  [x] Radiologist's Report reviewed at time of ED visit:  CT ABDOMEN PELVIS W IV CONTRAST Additional Contrast? None    Result Date: 8/16/2022  EXAMINATION: CT OF THE ABDOMEN AND PELVIS WITH CONTRAST 8/16/2022 6:14 pm TECHNIQUE: CT of the abdomen and pelvis was performed with the administration of intravenous contrast. Multiplanar reformatted images are provided for review. Automated exposure control, iterative reconstruction, and/or weight based adjustment of the mA/kV was utilized to reduce the radiation dose to as low as reasonably achievable.  COMPARISON: CT abdomen pelvis June 6, 2014 HISTORY: ORDERING SYSTEM PROVIDED HISTORY: flank pain TECHNOLOGIST PROVIDED HISTORY: Reason for exam:->flank pain Additional Contrast?->None Decision Support Exception - unselect if not a suspected or confirmed emergency medical condition->Emergency Medical Condition ([place]) Reason for Exam: bilateral flank pain/ \"throbbing pain\" Additional signs and symptoms: 80ml Isovue 370 FINDINGS: Lower Chest:  Visualized portion of the lower chest demonstrates no acute abnormality. Organs: The liver appears unremarkable. Cholelithiasis. Spleen, pancreas, adrenal glands, and kidneys demonstrate no acute abnormality. Simple appearing cyst of the bilateral kidneys. No additional routine follow-up per current guidelines. No hydronephrosis or obstructive uropathy identified. GI/Bowel: No bowel obstruction. Colonic diverticulosis without CT evidence of diverticulitis. Moderate volume of stool throughout the colon. Pelvis: The bladder is collapsed not well evaluated. The uterus is surgically absent. Peritoneum/Retroperitoneum: The abdominal aorta is normal in caliber with mild atherosclerotic plaque. No retroperitoneal adenopathy. No free fluid or free air identified. Bones/Soft Tissues: No acute osseous or soft tissue abnormality identified. Multilevel degenerative change of the spine. Mild degenerative changes of the bilateral hips. 1. No acute intra-abdominal process identified. 2. No obstructive uropathy identified. 3. Cholelithiasis without CT evidence of cholecystitis. 4. Diverticulosis without CT evidence of diverticulitis. ED Course and MDM:  Patient's pain medication and is comfortable on reassessment. He is neurologically intact. Labs reviewed. No significant leukocytosis. Renal function without significant electrolyte derangement beyond mild hyperglycemia. Urinalysis without convincing evidence of infection. CT abdomen pelvis obtained. No evidence of obstructive uropathy. Suspect musculoskeletal etiology of patient's discomfort. She is encouraged to use ice on the area. She is also placed on a short course of NSAIDs, lidocaine patches, and given muscle relaxers to use as needed. She is cautioned regarding the potential sedating side effects of this medication.     I think patient is appropriate for outpatient management. Patient is given instructions regarding symptomatic care at home as well as return precautions. To call PCP for follow up in 2-3 days. Patient verbalizes understanding of all instructions and is comfortable with the plan of care. Final Impression:  1. Acute bilateral low back pain without sciatica      DISPOSITION Decision To Discharge 08/16/2022 08:01:12 PM      Patient referred to:  Sandoval Butts MD  [address]  [address] [facility].  [phone]    Schedule an appointment as soon as possible for a visit in 2 days      [facility] Emergency Department  [facility] [phone]  [phone]    If symptoms worsen  Discharge medications:  Discharge Medication List as of 8/16/2022  8:10 PM        START taking these medications    Details   ibuprofen (ADVIL;MOTRIN) 600 MG tablet Take 1 tablet by mouth every 8 hours as needed for Pain, Disp-20 tablet, R-0Normal      lidocaine (LIDODERM) 5 % Place 2 patches onto the skin in the morning for 8 days. 12 hours on, 12 hours off. ., Disp-15 patch, R-0Normal      diazePAM (VALIUM) 2 MG tablet 1-2 tabs by mouth PO Q8 hours PRN muscle spasm., Disp-8 tablet, R-0Normal           (Please note that portions of this note may have been completed with a voice recognition program. Efforts were made to edit the dictations but occasionally words are mis-transcribed.)    Katherine Garcia,   08/16/22 0801

## 2022-08-17 LAB
CULTURE: NORMAL
EKG ATRIAL RATE: 73 BPM
EKG DIAGNOSIS: NORMAL
EKG Q-T INTERVAL: 392 MS
EKG QRS DURATION: 86 MS
EKG QTC CALCULATION (BAZETT): 431 MS
EKG R AXIS: 14 DEGREES
EKG T AXIS: 189 DEGREES
EKG VENTRICULAR RATE: 73 BPM
Lab: NORMAL
SPECIMEN: NORMAL

## 2022-08-17 PROCEDURE — 93010 ELECTROCARDIOGRAM REPORT: CPT | Performed by: INTERNAL MEDICINE

## 2022-08-24 PROBLEM — Z12.11 COLON CANCER SCREENING: Status: RESOLVED | Noted: 2022-07-25 | Resolved: 2022-08-24

## 2024-12-31 LAB — MAMMOGRAPHY, EXTERNAL: NORMAL

## 2025-07-17 RX ORDER — ALENDRONATE SODIUM 70 MG/1
70 TABLET ORAL
COMMUNITY

## 2025-08-21 ENCOUNTER — TELEPHONE (OUTPATIENT)
Dept: GASTROENTEROLOGY | Age: 75
End: 2025-08-21

## (undated) DEVICE — ENDOSCOPY KIT: Brand: MEDLINE INDUSTRIES, INC.